# Patient Record
(demographics unavailable — no encounter records)

---

## 2024-11-19 NOTE — PHYSICAL EXAM
[Normal] : no rashes [Laryngoscopy Performed] : laryngoscopy was performed, see procedure section for findings [de-identified] : Posttreatment changes, no LAD. [FreeTextEntry1] : Posttreatment changes.  No concerning lesions in the OC/OPx on inspection or palpation.

## 2024-11-19 NOTE — PROCEDURE
[Lesion] : lesion identified by mirror examination needing further evaluation [None] : none [Flexible Endoscope] : examined with the flexible endoscope [Serial Number: ___] : Serial Number: [unfilled] [de-identified] : After patient consents, a FFL is performed.  No lesions in the NPx, OPx, HPx or larynx.  Expected posttreatment changes, VC are mobile, airway patent.

## 2024-11-19 NOTE — REASON FOR VISIT
[Subsequent Evaluation] : a subsequent evaluation for [Family Member] : family member [Patient Declined  Services] : - None: Patient declined  services [Source: ______] : History obtained from [unfilled] [FreeTextEntry2] : tonsil cancer [FreeTextEntry3] : Patient declined offer of translation service. Patient preferred to use accompanying family member (granddaughter)for translation.

## 2024-11-19 NOTE — HISTORY OF PRESENT ILLNESS
[de-identified] : 82 year old male with SCCa of left tonsil  s/p Biopsy 7/15/23 showing SCCa, Completed radiation 11/01/23 and chemo 10/24/23. ct neck and ct chest 8/12/2024 and repeat ct chest 10/15/2024  Pt is here for 3 months f/u, doing well. stable wt. Tolerating regular diet. taste is back- but improved since last visit.  Denies pain, dyspnea, dysphonia, globus sensation, bleeding, recent fevers, throat or oral infections

## 2024-11-20 NOTE — HISTORY OF PRESENT ILLNESS
[FreeTextEntry1] : 82-year-old male with history of longstanding DM (~20 years), HTN, CAD (underwent PCI with stent placement), SCC of tonsils (received chemo and radiation), CKD, R eye blindness (~4 years) and HLD presented to clinic for follow-up visit. Pt. was seen for initial evaluation of CKD on 5/8/24.   Kidney [facility]: [facility] noted to have elevated Scr levels since November 2020. Pt. was hospitalized at [facility] in January 2024. Scr was elevated at 1.39 at time of discharge on 1/4/24. Scr was elevated/stable at 1.48 on 2/26/24. Scr increased to 1.7 on outpatient labs done on 4/20/24. Last Scr decreased to 1.32 on 5/8/24. UPCR was WNL at 0.1 on 5/8/24. No monoclonal band identified on SIFE (5/8/24). HBsAg and HCV Ab were nonreactive on 8/4/23. CT scan performed on 10/22/23 showed B/L kidney cysts and 0.4 cm, non-obstructing left kidney stone but no hydronephrosis. Renal US performed on 7/11/24 showed bilateral kidney cysts and 2.1mm non-obstructing renal stone.  Pt. currently feels well. No fevers, chest pain, SOB, abdominal pain, HA or LE edema during clinic visit today.

## 2024-11-20 NOTE — PHYSICAL EXAM
[Time Spent: ___ minutes] : I have spent [unfilled] minutes of time on the encounter. [General Appearance - Alert] : alert [General Appearance - In No Acute Distress] : in no acute distress [Sclera] : the sclera and conjunctiva were normal [Outer Ear] : the ears and nose were normal in appearance [Neck Appearance] : the appearance of the neck was normal [Jugular Venous Distention Increased] : there was no jugular-venous distention [Respiration, Rhythm And Depth] : normal respiratory rhythm and effort [Auscultation Breath Sounds / Voice Sounds] : lungs were clear to auscultation bilaterally [Heart Rate And Rhythm] : heart rate was normal and rhythm regular [Heart Sounds] : normal S1 and S2 [Heart Sounds Gallop] : no gallops [Murmurs] : no murmurs [Heart Sounds Pericardial Friction Rub] : no pericardial rub [Edema] : there was no peripheral edema [Bowel Sounds] : normal bowel sounds [Abdomen Soft] : soft [Abdomen Tenderness] : non-tender [No CVA Tenderness] : no ~M costovertebral angle tenderness [Nail Clubbing] : no clubbing  or cyanosis of the fingernails [] : no rash [Oriented To Time, Place, And Person] : oriented to person, place, and time [Affect] : the affect was normal [Mood] : the mood was normal [FreeTextEntry1] : scar (from previous feeding tube) seen

## 2024-11-20 NOTE — END OF VISIT
[FreeTextEntry3] : I was physically present for the key portions of the evaluation and management (E/M) service provided. I agree with the above history, ROS, physical exam, assessment, and plan which I have reviewed and edited where appropriate. I have also reviewed the assessment and management of CKD and HTN with the patient and his daughter during clinic visit today.  Pt. with CKD stage 3a in setting of longstanding DM. Pt. with likely diabetic kidney disease. Last Scr elevated at 1.32 in May 2024. Repeat BP reading in acceptable range during clinic visit today. Check renal panel today. Monitor BP and labs. Avoid nephrotoxins.

## 2024-11-20 NOTE — REVIEW OF SYSTEMS
[As Noted in HPI] : as noted in HPI [Eyesight Problems] : eyesight problems [Cough] : cough [Fever] : no fever [Loss Of Hearing] : no hearing loss [Sore Throat] : no sore throat [Chest Pain] : no chest pain [Lower Ext Edema] : no extremity edema [Shortness Of Breath] : no shortness of breath [Abdominal Pain] : no abdominal pain [Vomiting] : no vomiting [Dysuria] : no dysuria [Arthralgias] : no arthralgias [Limb Pain] : no limb pain [Limb Swelling] : no limb swelling [Skin Lesions] : no skin lesions [Confused] : no confusion [Dizziness] : no dizziness [Difficulty Walking] : no difficulty walking [Anxiety] : no anxiety [Depression] : no depression [Hot Flashes] : no hot flashes [Muscle Weakness] : no muscle weakness [FreeTextEntry3] : R eye blindness, L eye cataract, laser treatments in both eyes [FreeTextEntry5] : CAD (underwent PCI with stent placement in the past) [FreeTextEntry6] : chronic intermittent cough [de-identified] : SCC of tonsils (received chemo and radiation)

## 2024-11-20 NOTE — ASSESSMENT
[FreeTextEntry1] : 1. CKD stage 3a: Pt. with CKD in the setting of longstanding DM. Pt. with likely diabetic nephropathy. On review of [employer]/[facility], pt. noted to have elevated Scr levels since November 2020. Pt. was hospitalized at [facility] in January 2024. Scr was elevated at 1.39 at time of discharge on 1/4/24. Scr was elevated/stable at 1.48 on 2/26/24. Scr increased to 1.7 on outpatient labs done on 4/20/24. Last Scr decreased to 1.32 on 5/8/24. UPCR was WNL at 0.1 on 5/8/24. No monoclonal band identified on SIFE (5/8/24). HBsAg and HCV Ab were nonreactive on 8/4/23. CT scan performed on 10/22/23 showed B/L kidney cysts and 0.4 cm, non-obstructing left kidney stone but no hydronephrosis. Renal US performed on 7/11/24 showed bilateral kidney cysts and 2.1mm non-obstructing renal stone. Check renal panel today. Importance of good glycemic and BP control discussed with pt. during clinic visit today. Avoid NSAIDs. Monitor renal panel.  2. HTN: Repeat BP reading in acceptable range during clinic visit today. Low salt diet advised. Monitor BP.  RTC in 6 months.

## 2025-03-03 NOTE — PHYSICAL EXAM
[Normal] : no rashes [Laryngoscopy Performed] : laryngoscopy was performed, see procedure section for findings [de-identified] : Posttreatment changes, no LAD. [FreeTextEntry1] : Posttreatment changes.  No concerning lesions in the OC/OPx on inspection or palpation.

## 2025-03-03 NOTE — PROCEDURE
[Lesion] : lesion identified by mirror examination needing further evaluation [None] : none [Flexible Endoscope] : examined with the flexible endoscope [Serial Number: ___] : Serial Number: [unfilled] [de-identified] : After patient consents, a FFL is performed.  No lesions in the NPx, OPx, HPx or larynx.  Expected posttreatment changes, VC are mobile, airway patent.

## 2025-03-03 NOTE — HISTORY OF PRESENT ILLNESS
[de-identified] : 83 year old male with SCCa of left tonsil  s/p Biopsy 7/15/23 showing SCCa, Completed radiation 11/01/23 and chemo 10/24/23. ct neck and ct chest 8/12/2024 and repeat ct chest 10/15/2024  Pt is here for 3 months f/u, doing well. stable wt. Tolerating regular diet. taste is much better.  Denies pain, dyspnea, dysphonia, globus sensation, bleeding, recent fevers, throat or oral infections

## 2025-03-11 NOTE — ASSESSMENT
[FreeTextEntry1] : Mr. Meyers is a 83yoM presenting to clinic for follow up doing well with a pmh of CAD c/b IWSTEMI in 2017 with resulting ICM without clinical HF.  #ICM Repeat TTE shows LVEF 35%. G1DD, no significant valvular disease, improved from LVEF 30% and G3DD. Warm, Well perfused, Euvolemic. No signs/symptoms of HF. Currently on Metop XL 25mg, Hydral 10mg TID, ASA 81mg, Atorvastatin 40mg, Jardiance 10mg.  BP today elevated. Reports that his BP at home is 130-140's systolic.  He has a hx of hyperkalemia and CKD limiting additional GDMT.  Recommendations: -Cont ASA/Atorvastatin -Continue Metop XL 25mg and Jardiance 10mg, Hydral 10mg TID. -Cannot start additional GDMT given BP/Hyperkalemia.

## 2025-03-11 NOTE — CARDIOLOGY SUMMARY
[de-identified] :  LBBB, NSR   [de-identified] : 1. Left ventricular systolic function is moderately to severely decreased with an ejection fraction of 35 % by Catalan's method of disks.  2. There is mild (grade 1) left ventricular diastolic dysfunction.  3. Normal right ventricular cavity size, with wall thickness, and normal systolic function.  4. No significant valvular disease.  5. No pericardial effusion seen.

## 2025-03-11 NOTE — REASON FOR VISIT
[Symptom and Test Evaluation] : symptom and test evaluation [Cardiac Failure] : cardiac failure [CV Risk Factors and Non-Cardiac Disease] : CV risk factors and non-cardiac disease [Hyperlipidemia] : hyperlipidemia [Hypertension] : hypertension [Coronary Artery Disease] : coronary artery disease

## 2025-03-11 NOTE — PHYSICAL EXAM
[Well Developed] : well developed [Well Nourished] : well nourished [Normal Conjunctiva] : normal conjunctiva [Normal Venous Pressure] : normal venous pressure [Normal S1, S2] : normal S1, S2 [Murmur] : murmur [Clear Lung Fields] : clear lung fields [Soft] : abdomen soft [Non Tender] : non-tender [Normal Gait] : normal gait [No Edema] : no edema [No Rash] : no rash [Moves all extremities] : moves all extremities [Alert and Oriented] : alert and oriented [de-identified] : 2/6 systolic

## 2025-03-11 NOTE — HISTORY OF PRESENT ILLNESS
[FreeTextEntry1] : Mr. Meyers is a 83yoM presenting to the office today for follow up.  PMH of former smoker (0.5 ppd, >50 pack years, quit in ~2021), w/ hx of HTN, HLD, CKD, CAD c/b MI 2017 s/p stent x2, ICM (LVEF: 30%) T2DM, diagnosed with squamous cell carcinoma of left tonsil s/p biopsy on 7/14/2023 and underwent prophylactic PEG tube placement on 8/17/23. He was started on definitive chemo/RT in Sept 2023 with weekly Carboplatin/Paclitaxel and completed 7 cycles of weekly chemotherapy on 10/18/2023; completed definitive RT on 11/1/2023. S/p Upper endoscopy, PEG removal on 12/28/23.  Previously seen 2/24where he was at his baseline of health. Vital signs at our apt showed asymptomatic BP of 88 systolic. As a result of this apt we stopped his Hydral 25mg TID and Isordil 10mg TID. His BP improved and his PCP restarted his hydral at 10mg TID. Reports that his home BP readings are 130's-140's systolic on this regimen. I repeated his TTE which revealed LVEF 35% G1DD from LVEF 30% and G3DD previously.  Our last apt was 09/24 he was doing well at this apt.   Today he continues to report that he feels well at baseline. Continue to report that he walks 2-4km daily without any symptoms. He denies Orthopnea, PND, MIREILLE, Abdominal distention, CP, SOB, palpitations. He reports that he is still taking the hydral 10mg not the 25mg. His BP is well controlled I will re-order the 10mg dose.  His HR is on the lower side but he specifically denies fatigue, dizziness, lightheadedness even with position change.

## 2025-03-28 NOTE — HISTORY OF PRESENT ILLNESS
[FreeTextEntry1] : 83-year-old male with history of longstanding DM (~20 years), HTN, CAD (underwent PCI with stent placement), SCC of tonsils (received chemo and radiation), CKD, R eye blindness (~4 years) and HLD presented to clinic for follow-up visit. Pt. was seen for initial evaluation of CKD on 5/8/24. Last clinic visit was on 11/20/24.  Kid[facility]: [facility] noted to have elevated Scr levels since November 2020. Pt. was hospitalized at [facility] in January 2024. Scr was elevated at 1.39 at time of discharge on 1/4/24. Scr was elevated/stable at 1.48 on 2/26/24. Scr increased to 1.7 on outpatient labs done on 4/20/24. Scr decreased to 1.32 on 5/8/24. Last Scr remained elevated/stable at 1.51 on 11/20/24. UPCR was WNL at 0.1 on 5/8/24. No monoclonal band identified on SIFE (5/8/24). HBsAg and HCV Ab were nonreactive on 8/4/23. CT scan performed on 10/22/23 showed B/L kidney cysts and 0.4 cm, non-obstructing left kidney stone but no hydronephrosis. Renal US performed on 7/11/24 showed bilateral kidney cysts and 2.1mm non-obstructing renal stone.  Pt. currently feels well. No fevers, chest pain, SOB, abdominal pain, HA or LE edema during clinic visit today.

## 2025-03-28 NOTE — REASON FOR VISIT
[Follow-Up] : a follow-up visit [Family Member] : family member [FreeTextEntry1] : Chronic kidney disease

## 2025-03-28 NOTE — HISTORY OF PRESENT ILLNESS
[FreeTextEntry1] : 83-year-old male with history of longstanding DM (~20 years), HTN, CAD (underwent PCI with stent placement), SCC of tonsils (received chemo and radiation), CKD, R eye blindness (~4 years) and HLD presented to clinic for follow-up visit. Pt. was seen for initial evaluation of CKD on 5/8/24. Last clinic visit was on 11/20/24.  [facility]: [facility] noted to have elevated Scr levels since November 2020. Pt. was hospitalized at [facility] in January 2024. Scr was elevated at 1.39 at time of discharge on 1/4/24. Scr was elevated/stable at 1.48 on 2/26/24. Scr increased to 1.7 on outpatient labs done on 4/20/24. Scr decreased to 1.32 on 5/8/24. Last Scr remained elevated/stable at 1.51 on 11/20/24. UPCR was WNL at 0.1 on 5/8/24. No monoclonal band identified on SIFE (5/8/24). HBsAg and HCV Ab were nonreactive on 8/4/23. CT scan performed on 10/22/23 showed B/L kidney cysts and 0.4 cm, non-obstructing left kidney stone but no hydronephrosis. Renal US performed on 7/11/24 showed bilateral kidney cysts and 2.1mm non-obstructing renal stone.  Pt. currently feels well. No fevers, chest pain, SOB, abdominal pain, HA or LE edema during clinic visit today.

## 2025-03-28 NOTE — REVIEW OF SYSTEMS
[As Noted in HPI] : as noted in HPI [Eyesight Problems] : eyesight problems [Fever] : no fever [Loss Of Hearing] : no hearing loss [Sore Throat] : no sore throat [Chest Pain] : no chest pain [Lower Ext Edema] : no extremity edema [Shortness Of Breath] : no shortness of breath [Cough] : no cough [Abdominal Pain] : no abdominal pain [Vomiting] : no vomiting [Dysuria] : no dysuria [Arthralgias] : no arthralgias [Limb Pain] : no limb pain [Limb Swelling] : no limb swelling [Skin Lesions] : no skin lesions [Confused] : no confusion [Dizziness] : no dizziness [Difficulty Walking] : no difficulty walking [Anxiety] : no anxiety [Depression] : no depression [Hot Flashes] : no hot flashes [Muscle Weakness] : no muscle weakness [FreeTextEntry5] : CAD (underwent PCI with stent placement in the past) [FreeTextEntry3] : R eye blindness, L eye cataract, laser treatments in both eyes [de-identified] : SCC of tonsils (received chemo and radiation)

## 2025-03-28 NOTE — END OF VISIT
[] : Fellow [FreeTextEntry3] : I was physically present for the key portions of the evaluation and management (E/M) service provided. I agree with the above history, ROS, physical exam, assessment, and plan which I have reviewed and edited where appropriate. I have also reviewed the assessment and management of CKD and HTN with the patient and his daughter during clinic visit today.  Pt. with CKD stage 3a in setting of longstanding DM. Pt. with likely diabetic kidney disease. Last Scr elevated at 1.32 in May 2024. Repeat BP reading in acceptable range during clinic visit today. Check renal panel today. Monitor BP and labs. Avoid nephrotoxins. [Time Spent: ___ minutes] : I have spent [unfilled] minutes of time on the encounter which excludes teaching and separately reported services.

## 2025-03-28 NOTE — PHYSICAL EXAM
[General Appearance - Alert] : alert [General Appearance - In No Acute Distress] : in no acute distress [Sclera] : the sclera and conjunctiva were normal [Outer Ear] : the ears and nose were normal in appearance [Neck Appearance] : the appearance of the neck was normal [Jugular Venous Distention Increased] : there was no jugular-venous distention [Respiration, Rhythm And Depth] : normal respiratory rhythm and effort [Auscultation Breath Sounds / Voice Sounds] : lungs were clear to auscultation bilaterally [Heart Rate And Rhythm] : heart rate was normal and rhythm regular [Heart Sounds] : normal S1 and S2 [Heart Sounds Gallop] : no gallops [Murmurs] : no murmurs [Heart Sounds Pericardial Friction Rub] : no pericardial rub [Edema] : there was no peripheral edema [Bowel Sounds] : normal bowel sounds [Abdomen Soft] : soft [Abdomen Tenderness] : non-tender [No CVA Tenderness] : no ~M costovertebral angle tenderness [Nail Clubbing] : no clubbing  or cyanosis of the fingernails [] : no rash [Oriented To Time, Place, And Person] : oriented to person, place, and time [Affect] : the affect was normal [Mood] : the mood was normal [FreeTextEntry1] : scar (from previous feeding tube) seen

## 2025-03-28 NOTE — ASSESSMENT
[FreeTextEntry1] : 1. CKD stage 3a: Pt. with CKD in the setting of longstanding DM. Pt. with likely diabetic nephropathy. On review of [employer]/[facility], pt. noted to have elevated Scr levels since November 2020. Pt. was hospitalized at [facility] in January 2024. Scr was elevated at 1.39 at time of discharge on 1/4/24. Scr was elevated/stable at 1.48 on 2/26/24. Scr increased to 1.7 on outpatient labs done on 4/20/24. Scr decreased to 1.32 on 5/8/24. Last Scr remained elevated/stable at 1.51 on 11/20/24. UPCR was WNL at 0.1 on 5/8/24. No monoclonal band identified on SIFE (5/8/24). HBsAg and HCV Ab were nonreactive on 8/4/23. CT scan performed on 10/22/23 showed B/L kidney cysts and 0.4 cm, non-obstructing left kidney stone but no hydronephrosis. Renal US performed on 7/11/24 showed bilateral kidney cysts and 2.1mm non-obstructing renal stone. Check renal panel and UPCR today. Importance of good glycemic and BP control discussed with pt. during clinic visit today. Avoid NSAIDs. Monitor renal panel.  2. HTN: BP reading in acceptable range during clinic visit today. Low salt diet advised. Monitor BP.  RTC in 6 months.

## 2025-03-28 NOTE — ASSESSMENT
[FreeTextEntry1] : 1. CKD stage 3a: Pt. with CKD in the setting of longstanding DM. Pt. with likely diabetic nephropathy. On review of [employer]/[facility], pt. noted to have elevated Scr levels since November 2020. Pt. was hospitalized at [facility] in January 2024. Scr was elevated at 1.39 at time of discharge on 1/4/24. Scr was elevated/stable at 1.48 on 2/26/24. Scr increased to 1.7 on outpatient labs done on 4/20/24. Scr decreased to 1.32 on 5/8/24. Last Scr remained elevated/stable at 1.51 on 11/20/24. UPCR was WNL at 0.1 on 5/8/24. No monoclonal band identified on SIFE (5/8/24). HBsAg and HCV Ab were nonreactive on 8/4/23. CT scan performed on 10/22/23 showed B/L kidney cysts and 0.4 cm, non-obstructing left kidney stone but no hydronephrosis. Renal US performed on 7/11/24 showed bilateral kidney cysts and 2.1mm non-obstructing renal stone. Check renal panel and UPCR today. Importance of good glycemic and BP control discussed with pt. during clinic visit today. Avoid NSAIDs. Monitor renal panel.  2. HTN: BP reading in acceptable range during clinic visit today. Low salt diet advised. Monitor BP.  RTC [facility] 6 months. [facility]

## 2025-03-28 NOTE — REVIEW OF SYSTEMS
[As Noted in HPI] : as noted in HPI [Eyesight Problems] : eyesight problems [Fever] : no fever [Loss Of Hearing] : no hearing loss [Sore Throat] : no sore throat [Chest Pain] : no chest pain [Lower Ext Edema] : no extremity edema [Shortness Of Breath] : no shortness of breath [Cough] : no cough [Abdominal Pain] : no abdominal pain [Vomiting] : no vomiting [Dysuria] : no dysuria [Arthralgias] : no arthralgias [Limb Pain] : no limb pain [Limb Swelling] : no limb swelling [Skin Lesions] : no skin lesions [Confused] : no confusion [Dizziness] : no dizziness [Difficulty Walking] : no difficulty walking [Anxiety] : no anxiety [Depression] : no depression [Hot Flashes] : no hot flashes [Muscle Weakness] : no muscle weakness [FreeTextEntry5] : CAD (underwent PCI with stent placement in the past) [FreeTextEntry3] : R eye blindness, L eye cataract, laser treatments in both eyes [de-identified] : SCC of tonsils (received chemo and radiation)

## 2025-06-10 NOTE — PROCEDURE
[Lesion] : lesion identified by mirror examination needing further evaluation [None] : none [Flexible Endoscope] : examined with the flexible endoscope [Serial Number: ___] : Serial Number: [unfilled] [de-identified] : After patient consents, a FFL is performed.  No lesions in the NPx, OPx, HPx or larynx.  Expected posttreatment changes, VC are mobile, airway patent.

## 2025-06-10 NOTE — PHYSICAL EXAM
[Normal] : no rashes [Laryngoscopy Performed] : laryngoscopy was performed, see procedure section for findings [de-identified] : Posttreatment changes, no LAD. [FreeTextEntry1] : Posttreatment changes.  No concerning lesions in the OC/OPx on inspection or palpation.

## 2025-06-10 NOTE — REASON FOR VISIT
[Subsequent Evaluation] : a subsequent evaluation for [Family Member] : family member [Patient Declined  Services] : - None: Patient declined  services [FreeTextEntry3] : Patient declined offer of translation service. Patient preferred to use accompanying family member (granddaughter)for translation.  [FreeTextEntry2] : h/o tonsil cancer

## 2025-06-10 NOTE — HISTORY OF PRESENT ILLNESS
[de-identified] : 83 year old male with h/o SCCa of left tonsil s/p Biopsy 7/15/23 showing SCCa. Completed radiation 11/01/23 and chemo 10/24/23. Most recent CT neck and ct chest 4/21/2025 [facility].  Patient presents today for 3 month follow-up. Reports he has been doing well since last clinic visit.  Tolerating regular diet and thin liquids without any difficulty. Sense of taste is back. Weight has been stable. Voice is good - denies complete loss of voice, vocal hoarseness, pain or SOB while speaking.  Patient denies throat/neck pain, globus sensation, dysphagia, odynophagia, dyspnea, hemoptysis or otalgia.  Denies recent fevers/infections, chills or night sweats.